# Patient Record
Sex: FEMALE | Race: WHITE | NOT HISPANIC OR LATINO | Employment: FULL TIME | URBAN - METROPOLITAN AREA
[De-identification: names, ages, dates, MRNs, and addresses within clinical notes are randomized per-mention and may not be internally consistent; named-entity substitution may affect disease eponyms.]

---

## 2020-07-06 ENCOUNTER — OFFICE VISIT (OUTPATIENT)
Dept: OBSTETRICS AND GYNECOLOGY | Facility: CLINIC | Age: 20
End: 2020-07-06
Payer: COMMERCIAL

## 2020-07-06 ENCOUNTER — PROCEDURE VISIT (OUTPATIENT)
Dept: OBSTETRICS AND GYNECOLOGY | Facility: CLINIC | Age: 20
End: 2020-07-06
Payer: COMMERCIAL

## 2020-07-06 VITALS
SYSTOLIC BLOOD PRESSURE: 110 MMHG | WEIGHT: 248 LBS | DIASTOLIC BLOOD PRESSURE: 70 MMHG | BODY MASS INDEX: 43.94 KG/M2 | HEIGHT: 63 IN

## 2020-07-06 DIAGNOSIS — N92.6 IRREGULAR PERIODS/MENSTRUAL CYCLES: ICD-10-CM

## 2020-07-06 DIAGNOSIS — N92.6 IRREGULAR PERIODS/MENSTRUAL CYCLES: Primary | ICD-10-CM

## 2020-07-06 PROCEDURE — 3008F PR BODY MASS INDEX (BMI) DOCUMENTED: ICD-10-PCS | Mod: CPTII,S$GLB,, | Performed by: OBSTETRICS & GYNECOLOGY

## 2020-07-06 PROCEDURE — 3008F BODY MASS INDEX DOCD: CPT | Mod: CPTII,S$GLB,, | Performed by: OBSTETRICS & GYNECOLOGY

## 2020-07-06 PROCEDURE — 99213 OFFICE O/P EST LOW 20 MIN: CPT | Mod: S$GLB,,, | Performed by: OBSTETRICS & GYNECOLOGY

## 2020-07-06 PROCEDURE — 76830 PR  ECHOGRAPHY,TRANSVAGINAL: ICD-10-PCS | Mod: S$GLB,,, | Performed by: OBSTETRICS & GYNECOLOGY

## 2020-07-06 PROCEDURE — 76830 TRANSVAGINAL US NON-OB: CPT | Mod: S$GLB,,, | Performed by: OBSTETRICS & GYNECOLOGY

## 2020-07-06 PROCEDURE — 99213 PR OFFICE/OUTPT VISIT, EST, LEVL III, 20-29 MIN: ICD-10-PCS | Mod: S$GLB,,, | Performed by: OBSTETRICS & GYNECOLOGY

## 2020-07-06 NOTE — PROGRESS NOTES
Subjective:       Patient ID: Niesha Vega is a 20 y.o. female.    Chief Complaint:  Consult (abn cycles)      History of Present Illness  HPI  Patient presents today with complaints of irregular cycles and being emotional  This patient was previously on the NuvaRing which she stopped in November because she and her partner wanted to stop her venting pregnancy.  She had 1 cycle in December and then has only had 1 since then and is extremely lee    GYN & OB History  No LMP recorded.   Date of Last Pap: No result found    OB History    Para Term  AB Living   0 0 0 0 0 0   SAB TAB Ectopic Multiple Live Births   0 0 0 0 0       Review of Systems  Review of Systems   Constitutional: Negative.  Negative for activity change, appetite change, chills, fatigue, fever and unexpected weight change.   HENT: Negative for nasal congestion.    Eyes: Negative for visual disturbance.   Respiratory: Negative for cough, shortness of breath and wheezing.    Cardiovascular: Negative for chest pain, palpitations and leg swelling.   Gastrointestinal: Negative.  Negative for abdominal pain, bloating, blood in stool, constipation, diarrhea and reflux.   Endocrine: Negative.  Negative for diabetes, hair loss, hot flashes, hyperthyroidism and hypothyroidism.   Genitourinary: Positive for menstrual problem. Negative for bladder incontinence, decreased libido, dysmenorrhea, dyspareunia, dysuria, flank pain, frequency, genital sores, hematuria, hot flashes, menorrhagia, pelvic pain, urgency, vaginal bleeding, vaginal discharge, vaginal pain, urinary incontinence, postcoital bleeding, postmenopausal bleeding, vaginal dryness and vaginal odor.   Musculoskeletal: Negative for back pain, joint swelling and myalgias.   Integumentary:  Negative for rash, acne, hair changes, mole/lesion, breast mass, nipple discharge, breast skin changes and breast tenderness. Negative.   Neurological: Negative.  Negative for vertigo, seizures,  syncope, numbness and headaches.   Hematological: Negative.  Negative for adenopathy. Does not bruise/bleed easily.   Psychiatric/Behavioral: Negative.  Negative for depression and sleep disturbance. The patient is not nervous/anxious.    Breast: negative.  Negative for asymmetry, breast self exam, lump, mass, mastodynia, nipple discharge, skin changes and tenderness          Objective:    Physical Exam:   Constitutional: She appears well-developed and well-nourished.    HENT:   Nose: No epistaxis.     Neck: No thyroid mass and no thyromegaly present.    Cardiovascular: Normal rate, regular rhythm and normal pulses.     Pulmonary/Chest: Effort normal and breath sounds normal. Chest wall is not dull to percussion. She exhibits no mass, no tenderness, no bony tenderness, no laceration, no crepitus, no edema, no deformity, no swelling and no retraction. Right breast exhibits no inverted nipple, no mass, no nipple discharge, no skin change, no tenderness, presence, no bleeding and no swelling. Left breast exhibits no inverted nipple, no mass, no nipple discharge, no skin change, no tenderness, presence, no bleeding and no swelling.        Abdominal: Soft. Normal appearance and bowel sounds are normal. There is no abdominal tenderness. Hernia confirmed negative in the right inguinal area and confirmed negative in the left inguinal area.     Genitourinary:    Vagina and uterus normal.      Pelvic exam was performed with patient supine.   There is no rash, tenderness, lesion or injury on the right labia. There is no rash, tenderness, lesion or injury on the left labia. Cervix is normal. Right adnexum displays no mass, no tenderness and no fullness. Left adnexum displays no mass, no tenderness and no fullness. No rectocele, cystocele or unspecified prolapse of vaginal walls in the vagina. Labial bartholins normal.               Skin: Skin is warm and dry.    Psychiatric: She has a normal mood and affect. Her speech is  normal and behavior is normal.          Assessment:        1. Irregular periods/menstrual cycles       Irregular periods/menstrual cycles  -     US OB/GYN Procedure ([facility]); Future  -     Comprehensive metabolic panel; Future; Expected date: 07/06/2020  -     DHEA-sulfate; Future; Expected date: 07/06/2020  -     Follicle stimulating hormone; Future; Expected date: 07/06/2020  -     Insulin, random; Future; Expected date: 07/06/2020  -     Luteinizing hormone; Future; Expected date: 07/06/2020  -     Prolactin; Future; Expected date: 07/06/2020  -     T4, free; Future; Expected date: 07/06/2020  -     Testosterone, free; Future; Expected date: 07/06/2020  -     TSH; Future; Expected date: 07/06/2020             Plan:      Follow up in about 4 weeks (around 8/3/2020).

## 2020-07-07 LAB
ALBUMIN SERPL-MCNC: 3.9 G/DL (ref 3.5–5.2)
ALBUMIN/GLOB SERPL ELPH: 1.3 {RATIO} (ref 1–2.7)
ALP ISOS SERPL LEV INH-CCNC: 82 U/L (ref 35–105)
ALT (SGPT): 22 U/L (ref 0–33)
ANION GAP SERPL CALC-SCNC: 10 MMOL/L (ref 8–17)
AST SERPL-CCNC: 18 U/L (ref 0–32)
BILIRUBIN, TOTAL: 0.34 MG/DL (ref 0–1.2)
BUN/CREAT SERPL: 17.7 (ref 6–20)
CALCIUM SERPL-MCNC: 9.1 MG/DL (ref 8.6–10.2)
CARBON DIOXIDE, CO2: 25 MMOL/L (ref 22–29)
CHLORIDE: 106 MMOL/L (ref 98–107)
CREAT SERPL-MCNC: 0.6 MG/DL (ref 0.5–0.9)
DHEA SULFATE: 176 UG/DL (ref 148–407)
FREE TESTOSTERONE: 1.06 NG/DL (ref 0–1)
FSH: 3.55 MIU/ML
GFR ESTIMATION: 127.45
GLOBULIN: 3.1 G/DL (ref 1.5–4.5)
GLUCOSE: 107 MG/DL (ref 74–106)
INSULIN AB SER QL: 43.9 UIU/ML (ref 2.6–24.9)
LH: 12.5 MIU/ML
POTASSIUM: 4.1 MMOL/L (ref 3.5–5.1)
PROLACTIN SERPL-MCNC: 40.2 NG/ML (ref 4.79–23.3)
PROT SNV-MCNC: 7 G/DL (ref 6.4–8.3)
SODIUM: 141 MMOL/L (ref 136–145)
T4, FREE: 1.05 NG/DL (ref 0.93–1.7)
TSH SERPL DL<=0.005 MIU/L-ACNC: 3.33 UIU/ML (ref 0.27–4.2)
UREA NITROGEN (BUN): 10.6 MG/DL (ref 6–20)

## 2020-08-03 ENCOUNTER — OFFICE VISIT (OUTPATIENT)
Dept: OBSTETRICS AND GYNECOLOGY | Facility: CLINIC | Age: 20
End: 2020-08-03
Payer: COMMERCIAL

## 2020-08-03 VITALS — HEIGHT: 63 IN | DIASTOLIC BLOOD PRESSURE: 90 MMHG | SYSTOLIC BLOOD PRESSURE: 128 MMHG | BODY MASS INDEX: 43.93 KG/M2

## 2020-08-03 DIAGNOSIS — E22.1 HYPERPROLACTINEMIA: ICD-10-CM

## 2020-08-03 DIAGNOSIS — N92.6 IRREGULAR PERIODS/MENSTRUAL CYCLES: ICD-10-CM

## 2020-08-03 DIAGNOSIS — E88.819 INSULIN RESISTANCE: Primary | ICD-10-CM

## 2020-08-03 PROCEDURE — 99213 OFFICE O/P EST LOW 20 MIN: CPT | Mod: S$GLB,,, | Performed by: OBSTETRICS & GYNECOLOGY

## 2020-08-03 PROCEDURE — 99213 PR OFFICE/OUTPT VISIT, EST, LEVL III, 20-29 MIN: ICD-10-PCS | Mod: S$GLB,,, | Performed by: OBSTETRICS & GYNECOLOGY

## 2020-08-03 PROCEDURE — 3008F BODY MASS INDEX DOCD: CPT | Mod: CPTII,S$GLB,, | Performed by: OBSTETRICS & GYNECOLOGY

## 2020-08-03 PROCEDURE — 3008F PR BODY MASS INDEX (BMI) DOCUMENTED: ICD-10-PCS | Mod: CPTII,S$GLB,, | Performed by: OBSTETRICS & GYNECOLOGY

## 2020-08-03 RX ORDER — METFORMIN HYDROCHLORIDE 500 MG/1
1500 TABLET, EXTENDED RELEASE ORAL NIGHTLY
Qty: 90 TABLET | Refills: 3 | Status: SHIPPED | OUTPATIENT
Start: 2020-08-03 | End: 2020-09-02

## 2020-08-03 NOTE — PROGRESS NOTES
Subjective:       Patient ID: Niesha Vega is a 20 y.o. female.    Chief Complaint:  Follow-up      History of Present Illness  HPI  Patient presents today with complaints of over her lab results for her irregular cycles    GYN & OB History  No LMP recorded.   Date of Last Pap: No result found    OB History    Para Term  AB Living   0 0 0 0 0 0   SAB TAB Ectopic Multiple Live Births   0 0 0 0 0       Review of Systems  Review of Systems   Constitutional: Negative.  Negative for activity change, appetite change, chills, fatigue, fever and unexpected weight change.   HENT: Negative for nasal congestion.    Eyes: Negative for visual disturbance.   Respiratory: Negative for cough, shortness of breath and wheezing.    Cardiovascular: Negative for chest pain, palpitations and leg swelling.   Gastrointestinal: Negative.  Negative for abdominal pain, bloating, blood in stool, constipation, diarrhea and reflux.   Endocrine: Negative.  Negative for diabetes, hair loss, hot flashes, hyperthyroidism and hypothyroidism.   Genitourinary: Positive for menstrual problem. Negative for bladder incontinence, decreased libido, dysmenorrhea, dyspareunia, dysuria, flank pain, frequency, genital sores, hematuria, hot flashes, menorrhagia, pelvic pain, urgency, vaginal bleeding, vaginal discharge, vaginal pain, urinary incontinence, postcoital bleeding, postmenopausal bleeding, vaginal dryness and vaginal odor.        Has had irregular cycles since stopping NuvaRing probably only 2 in the last 8 months   Musculoskeletal: Negative for back pain, joint swelling and myalgias.   Integumentary:  Negative for rash, acne, hair changes, mole/lesion, breast mass, nipple discharge, breast skin changes and breast tenderness. Negative.   Neurological: Negative.  Negative for vertigo, seizures, syncope, numbness and headaches.   Hematological: Negative.  Negative for adenopathy. Does not bruise/bleed easily.    Psychiatric/Behavioral: Negative.  Negative for depression and sleep disturbance. The patient is not nervous/anxious.    Breast: negative.  Negative for asymmetry, breast self exam, lump, mass, mastodynia, nipple discharge, skin changes and tenderness          Objective:    Physical Exam:    HENT:   Nose: No epistaxis.                                   Assessment:        1. Insulin resistance       Insulin resistance  -     Comprehensive metabolic panel; Future; Expected date: 08/03/2020  -     Insulin, random; Future; Expected date: 08/03/2020  -     Prolactin; Future; Expected date: 08/03/2020  -     metFORMIN (GLUCOPHAGE-XR) 500 MG XR 24hr tablet; Take 3 tablets (1,500 mg total) by mouth every evening.  Dispense: 90 tablet; Refill: 3    Hyperprolactinemia    Irregular periods/menstrual cycles             Plan:      Follow up in about 3 months (around 11/3/2020).     explained to her that we will start her Glucophage by doing 1 tonight and increasing up to 3 at night and discussed diet  Repeat labs prior to her 3 month follow-up would like to have her regular cycles on her own with dropping the insulin and testosterone levels  If her prolactin remains elevated she may require an MRI in the future  Pt was counseled on chronic anovulation/PCOS, including common signs/symptoms and the many factors that influence it.  Pt was also counseled on available treatment options, including the associated risks and benefits of each.  Pt voiced understanding and desires to proceed with metformin.  Medication dosing, side-effects, risks, benefits, and alternatives were discussed.   Pt was counseled on the link between obesity and PCOS.  Recommend a healthy diet and exercise regimen with goal 20% weight loss.

## 2020-08-18 ENCOUNTER — PATIENT MESSAGE (OUTPATIENT)
Dept: OBSTETRICS AND GYNECOLOGY | Facility: CLINIC | Age: 20
End: 2020-08-18

## 2021-04-19 ENCOUNTER — PATIENT OUTREACH (OUTPATIENT)
Dept: ADMINISTRATIVE | Facility: HOSPITAL | Age: 21
End: 2021-04-19

## 2022-01-27 ENCOUNTER — PATIENT MESSAGE (OUTPATIENT)
Dept: OBSTETRICS AND GYNECOLOGY | Facility: CLINIC | Age: 22
End: 2022-01-27
Payer: COMMERCIAL

## 2022-01-27 RX ORDER — DOXYLAMINE SUCCINATE AND PYRIDOXINE HYDROCHLORIDE 20; 20 MG/1; MG/1
1 TABLET, EXTENDED RELEASE ORAL 2 TIMES DAILY
Qty: 60 TABLET | Refills: 3 | Status: SHIPPED | OUTPATIENT
Start: 2022-01-27 | End: 2022-02-26

## 2022-02-21 ENCOUNTER — PROCEDURE VISIT (OUTPATIENT)
Dept: OBSTETRICS AND GYNECOLOGY | Facility: CLINIC | Age: 22
End: 2022-02-21
Payer: COMMERCIAL

## 2022-02-21 ENCOUNTER — INITIAL PRENATAL (OUTPATIENT)
Dept: OBSTETRICS AND GYNECOLOGY | Facility: CLINIC | Age: 22
End: 2022-02-21
Payer: COMMERCIAL

## 2022-02-21 VITALS — WEIGHT: 215 LBS | BODY MASS INDEX: 38.09 KG/M2 | DIASTOLIC BLOOD PRESSURE: 81 MMHG | SYSTOLIC BLOOD PRESSURE: 126 MMHG

## 2022-02-21 DIAGNOSIS — Z34.90 PREGNANCY, UNSPECIFIED GESTATIONAL AGE: Primary | ICD-10-CM

## 2022-02-21 DIAGNOSIS — Z34.90 PREGNANCY, UNSPECIFIED GESTATIONAL AGE: ICD-10-CM

## 2022-02-21 DIAGNOSIS — E88.819 INSULIN RESISTANCE: ICD-10-CM

## 2022-02-21 DIAGNOSIS — Z01.419 ENCOUNTER FOR WELL WOMAN EXAM: Primary | ICD-10-CM

## 2022-02-21 DIAGNOSIS — R51.9 NONINTRACTABLE HEADACHE, UNSPECIFIED CHRONICITY PATTERN, UNSPECIFIED HEADACHE TYPE: ICD-10-CM

## 2022-02-21 PROCEDURE — 0500F INITIAL PRENATAL CARE VISIT: CPT | Mod: CPTII,S$GLB,, | Performed by: OBSTETRICS & GYNECOLOGY

## 2022-02-21 PROCEDURE — 76801 PR US, OB <14WKS, TRANSABD, SINGLE GESTATION: ICD-10-PCS | Mod: S$GLB,,, | Performed by: OBSTETRICS & GYNECOLOGY

## 2022-02-21 PROCEDURE — 0500F PR INITIAL PRENATAL CARE VISIT: ICD-10-PCS | Mod: CPTII,S$GLB,, | Performed by: OBSTETRICS & GYNECOLOGY

## 2022-02-21 PROCEDURE — 76801 OB US < 14 WKS SINGLE FETUS: CPT | Mod: S$GLB,,, | Performed by: OBSTETRICS & GYNECOLOGY

## 2022-02-21 RX ORDER — BUTALBITAL, ACETAMINOPHEN AND CAFFEINE 50; 325; 40 MG/1; MG/1; MG/1
1 CAPSULE ORAL 4 TIMES DAILY PRN
Qty: 20 CAPSULE | Refills: 1 | Status: SHIPPED | OUTPATIENT
Start: 2022-02-21

## 2022-02-21 NOTE — PROGRESS NOTES
Flu shot rec - and covid vaccine  rec   stephany do the up and nipt only if us abn  Will add b6 and mag for headaches

## 2022-03-03 LAB
ABS NRBC COUNT: 0 X 10 3/UL (ref 0–0.01)
ABSOLUTE BASOPHIL: 0.04 X 10 3/UL (ref 0–0.22)
ABSOLUTE EOSINOPHIL: 0.14 X 10 3/UL (ref 0.04–0.54)
ABSOLUTE IMMATURE GRAN: 0.05 X 10 3/UL (ref 0–0.04)
ABSOLUTE LYMPHOCYTE: 1.97 X 10 3/UL (ref 0.86–4.75)
ABSOLUTE MONOCYTE: 0.55 X 10 3/UL (ref 0.22–1.08)
ANTIBODY SCREEN: NEGATIVE
BASOPHILS NFR BLD: 0.4 % (ref 0.2–1.2)
BLOOD GROUPING: NORMAL
BLOOD TYPE (D): NEGATIVE
EOSINOPHIL NFR BLD: 1.5 % (ref 0.7–7)
ESTIMATED AVERAGE GLUCOSE: 92 MG/DL
HBA1C MFR BLD: 4.8 % (ref 4–6)
HBV SURFACE AG SERPL QL IA: NONREACTIVE
HCT VFR BLD AUTO: 40.6 % (ref 37–47)
HCV IGG SERPL QL IA: NONREACTIVE
HGB BLD-MCNC: 13.7 G/DL (ref 12–16)
HIV 1+2 AB+HIV1 P24 AG SERPL QL IA: NONREACTIVE
IMMATURE GRANULOCYTES: 0.5 % (ref 0–0.5)
LYMPHOCYTES NFR BLD: 21.6 % (ref 19.3–53.1)
MCH RBC QN AUTO: 28.3 PG (ref 27–32)
MCHC RBC AUTO-ENTMCNC: 33.7 G/DL (ref 32–36)
MCV RBC AUTO: 83.9 FL (ref 82–100)
MONOCYTES NFR BLD: 6 % (ref 4.7–12.5)
NEUTROPHILS # BLD AUTO: 6.39 X 10 3/UL (ref 2.15–7.56)
NEUTROPHILS NFR BLD: 70 % (ref 34–71.1)
NUCLEATED RED BLOOD CELLS: 0 /100 WBC (ref 0–0.2)
PLATELET # BLD AUTO: 356 X 10 3/UL (ref 135–400)
RBC # BLD AUTO: 4.84 X 10 6/UL (ref 4.2–5.4)
RDW-SD: 42.6 FL (ref 37–54)
RUBELLA IGG SCREEN: NORMAL
SYPHILIS TREPONEMAL ANTIBODY: NONREACTIVE
T4, FREE: 1.29 NG/DL (ref 0.93–1.7)
TSH SERPL DL<=0.005 MIU/L-ACNC: 1.8 UIU/ML (ref 0.27–4.2)
WBC # BLD: 9.14 X 10 3/UL (ref 4.3–10.8)

## 2022-03-04 LAB
AMORPH URATE CRY URNS QL MICRO: ABNORMAL
BACTERIA #/AREA URNS HPF: ABNORMAL /[HPF]
BILIRUB UR QL STRIP: NEGATIVE
CLARITY UR: CLEAR
COLOR UR: YELLOW
EPITHELIAL CELLS: ABNORMAL
GLUCOSE (UA): NEGATIVE MG/DL
KETONES UR QL STRIP: ABNORMAL MG/DL
LEUKOCYTE ESTERASE UR QL STRIP: ABNORMAL
MUCOUS THREADS URNS QL MICRO: NEGATIVE
NITRITE UR QL STRIP: NEGATIVE
OCCULT BLOOD: NEGATIVE
PH, URINE: 6 (ref 5–7.5)
PROT UR QL STRIP: NEGATIVE MG/DL
RBC/HPF: NEGATIVE
SP GR UR STRIP: 1.02 (ref 1–1.03)
URINE CULTURE, ROUTINE: NORMAL
UROBILINOGEN, URINE: NORMAL E.U./DL (ref 0–1)
WBC/HPF: ABNORMAL